# Patient Record
Sex: MALE | URBAN - METROPOLITAN AREA
[De-identification: names, ages, dates, MRNs, and addresses within clinical notes are randomized per-mention and may not be internally consistent; named-entity substitution may affect disease eponyms.]

---

## 2018-04-18 ENCOUNTER — TELEPHONE (OUTPATIENT)
Dept: RHEUMATOLOGY | Facility: CLINIC | Age: 33
End: 2018-04-18

## 2018-04-18 NOTE — TELEPHONE ENCOUNTER
New patient referral received from Nathan Reyelts at [facility] for scleroderma. Called patient's Home number on file [phone] (home). Left a message X1 asking patient to call back regarding the referral we received. An intake form needs to be completed over the phone and records are needed from outside facilities. Awaiting a call back from the patient.  Carin Fontana CMA  4/18/2018 1:12 PM

## 2018-04-25 NOTE — TELEPHONE ENCOUNTER
Second and final attempt to contact patient regarding the referral we received. Message left asking patient to return my call to complete an intake form.  Carin Fontana CMA  4/25/2018 9:45 AM